# Patient Record
Sex: FEMALE | Race: WHITE | NOT HISPANIC OR LATINO | Employment: STUDENT | URBAN - METROPOLITAN AREA
[De-identification: names, ages, dates, MRNs, and addresses within clinical notes are randomized per-mention and may not be internally consistent; named-entity substitution may affect disease eponyms.]

---

## 2024-05-18 ENCOUNTER — PATIENT MESSAGE (OUTPATIENT)
Dept: FAMILY MEDICINE | Facility: CLINIC | Age: 29
End: 2024-05-18
Payer: COMMERCIAL

## 2024-05-20 ENCOUNTER — TELEPHONE (OUTPATIENT)
Dept: FAMILY MEDICINE | Facility: CLINIC | Age: 29
End: 2024-05-20
Payer: COMMERCIAL

## 2024-05-20 NOTE — LETTER
[facility]  [address] [facility] [place] [address]  Phone: [phone]  Fax: [phone] May 20, 2024     Patient: Sherin Núñez   YOB: 1995   Date of Visit: 06/22/2021       To Whom It May Concern:    IT HAS BEEN 2 OR MORE YEARS SINCE OUR LAST VISIT!!!!  IN ORDER FOR ME TO STILL BE LISTED AS YOUR PCP, YOU WILL NEED TO SCHEDULE AN APPOINTMENT TO STAY ON MY PANEL OF PATIENTS.    Are you active on [employer]? Please read below:  In order to care for you more efficiently, we have come up with a plan to give YOU more access to schedule appointments with us. There are several spots on my schedule that open weekly/daily. Please remember, I am in office Tuesday-Friday.  The trick is to log into your [employer] stacy to schedule a spot:  5 days in advance   3 days in advance  2 days in advance  At 7 pm the night before for an appointment the next day    FOR EXAMPLE:     logging into your [employer] on a Thursday, can give you the ability to schedule an appointment with me for the following week on Tuesday.   If you are sick and need an appointment the next day, log into [employer] at 7PM the night before to get an appointment for either 10:40am or 2:20pm the next day! It is crucial for you to get on at 7PM, as this is when these spots on my schedule open!   If you are NOT ACTIVE on [employer]:  please call us at [phone] to get scheduled.     THANK YOU FOR ALLOWING ME TO CARE FOR YOU. THANK YOU FOR CHOOSING [facility]!    If you have any questions or concerns, please don't hesitate to contact my office.    Sincerely,        Oracio Liu MD